# Patient Record
Sex: FEMALE | Race: WHITE
[De-identification: names, ages, dates, MRNs, and addresses within clinical notes are randomized per-mention and may not be internally consistent; named-entity substitution may affect disease eponyms.]

---

## 2019-07-11 ENCOUNTER — HOSPITAL ENCOUNTER (EMERGENCY)
Dept: HOSPITAL 91 - FTE | Age: 29
Discharge: HOME | End: 2019-07-11
Payer: MEDICAID

## 2019-07-11 ENCOUNTER — HOSPITAL ENCOUNTER (EMERGENCY)
Dept: HOSPITAL 10 - FTE | Age: 29
Discharge: HOME | End: 2019-07-11
Payer: MEDICAID

## 2019-07-11 VITALS
BODY MASS INDEX: 26.56 KG/M2 | BODY MASS INDEX: 26.56 KG/M2 | HEIGHT: 63 IN | WEIGHT: 149.91 LBS | HEIGHT: 63 IN | WEIGHT: 149.91 LBS

## 2019-07-11 VITALS — DIASTOLIC BLOOD PRESSURE: 78 MMHG | RESPIRATION RATE: 18 BRPM | SYSTOLIC BLOOD PRESSURE: 115 MMHG | HEART RATE: 104 BPM

## 2019-07-11 DIAGNOSIS — M25.571: Primary | ICD-10-CM

## 2019-07-11 PROCEDURE — 73630 X-RAY EXAM OF FOOT: CPT

## 2019-07-11 PROCEDURE — 73610 X-RAY EXAM OF ANKLE: CPT

## 2019-07-11 PROCEDURE — 99283 EMERGENCY DEPT VISIT LOW MDM: CPT

## 2019-07-11 PROCEDURE — 73590 X-RAY EXAM OF LOWER LEG: CPT

## 2019-07-11 RX ADMIN — HYDROCODONE BITARTRATE AND ACETAMINOPHEN 1 TAB: 5; 325 TABLET ORAL at 14:05

## 2019-07-11 RX ADMIN — ONDANSETRON 1 MG: 4 TABLET, ORALLY DISINTEGRATING ORAL at 14:05

## 2019-07-11 NOTE — EN
Date/Time of Note


Date/Time of Note


DATE: 7/11/19 


TIME: 13:55





ER Progress Note


29-year-old female presents for right ankle pain status post injury.





Medical screening exam initiated and lab/imaging tests ordered.  Patient will be


seen by another provider.











PATRICK WHITE DO                 Jul 11, 2019 13:55

## 2019-07-11 NOTE — ERD
ER Documentation


Chief Complaint


Chief Complaint





RIGHT ANKLE PAIN, SWELLING S/P FALL 10 MINUTES AGO, HIT ROCKS. CMS INTACT.





HPI


29-year-old female presenting with right ankle pain after she fell and hit a 


rock.  She states that she has pain with movement and there is pain to her heel.


 She has not taken any medications for her symptoms.  Denies any numbness or 


tingling.  Denies medical problems.  NKDA.  Surgical history denies.  Social 


history denies





ROS


All systems reviewed and are negative except as per history of present illness.





Medications


Home Meds


Active Scripts


Naproxen* (Naprosyn*) 500 Mg Tablet, 500 MG PO BID PRN for PAIN AND/OR 


INFLAMMATION, #30 TAB


   Prov:ANTONELLA DOMINGUEZ PA-C         19


Ondansetron (Ondansetron Odt) 4 Mg Tab.rapdis, 4 MG PO Q6H PRN for NAUSEA AND/OR


VOMITING, #10 TAB


   Prov:JEFF CASEY PA-C         10/12/16


Ibuprofen* (Motrin*) 400 Mg Tab, 400 MG PO Q6H PRN for PAIN AND OR ELEVATED 


TEMP, #30 TAB


   Prov:JEFF CASEY PA-C         10/12/16


Acetaminophen* (Tylenol*) 325 Mg Tablet, 2 TAB PO Q4 PRN for PAIN AND OR 


ELEVATED TEMP, #30 TAB


   Prov:JEFF CASEY PA-C         10/12/16


Amoxicillin* (Amoxicillin*) 500 Mg Cap, 500 MG PO BID for 10 Days, CAP


   Prov:JEFF CASEY PA-C         10/12/16


Reported Medications


Multivit/Min/Fol Ac/Iron/Pren* (Prenatal S*) 1 Tab Tab, 1 TAB PO DAILY, TAB


   4/20/15





Allergies


Allergies:  


Coded Allergies:  


     No Known Allergies (Verified  Allergy, Mild, 12)





PMhx/Soc


Medical and Surgical Hx:  pt denies Medical Hx, pt denies Surgical Hx


History of Surgery:  No


Anesthesia Reaction:  No


Hx Neurological Disorder:  Yes


Hx Respiratory Disorders:  No


Hx Cardiac Disorders:  No


Hx Psychiatric Problems:  No


Hx Miscellaneous Medical Probl:  No


Hx Alcohol Use:  No


Hx Substance Use:  No


Hx Tobacco Use:  No


Smoking Status:  Never smoker





FmHx


Family History:  No diabetes, No coronary disease, No other





Physical Exam


Vitals





Vital Signs


  Date      Temp  Pulse  Resp  B/P (MAP)   Pulse Ox  O2          O2 Flow    FiO2


Time                                                 Delivery    Rate


   19  99.4    104    18      115/78        97


     13:51                           (90)





Physical Exam


GENERAL: The patient is well-appearing, well-nourished, in no acute distress


CHEST: Clear to auscultation bilaterally.  There are no rales, wheezes or 


rhonchi.


HEART: Regular rate and rhythm.  No murmurs, clicks, rubs or gallops. 


EXTREMITIES: Equal pulses bilaterally.  There is no peripheral clubbing, 


cyanosis or edema.  No focal swelling or erythema.  Full range of motion.  


Grossly neurovascularly intact.


NEUROLOGIC: Alert and oriented.  Cranial nerves II through XII intact.  Motor 


strength in all 4 extremities with 5 out of 5 strength.  Sensation grossly 


intact.  Normal speech and gait.  


SKIN: Swelling noted to the right lateral ankle.  No pain with flexion and 


extension.  No tenderness to palpation of the proximal fibular head.  


Compartments soft.


Results 24 hrs





Current Medications


 Medications
   Dose
          Sig/Kirk
       Start Time
   Status  Last


 (Trade)       Ordered        Route
 PRN     Stop Time              Admin
Dose


                              Reason                                Admin


                1 tab          ONCE  ONCE
    19       DC           19


Acetaminophen                 PO
            14:30
                       14:05



/
                                           19 14:31


Hydrocodone


Bitart



(Norco


(5/325))


 Ondansetron    4 mg           ONCE  STAT
    19       DC           19


HCl
  (Zofran                 ODT
           14:01
                       14:05



Odt)                                         19 14:02








Procedures/MDM


                            DIAGNOSTIC IMAGING REPORT





Patient: VICTOR MANUEL GLOVER   : 1990   Age: 29  Sex: F                      


 


       MR #:    [id number]   Acct #:   [id number]    DOS: 19 1354


Ordering MD: PATRICK WHITE DO   Location:  [facility]   Room/Bed:                       


                    


                                        


PROCEDURE:   XR Ankle. 


 


CLINICAL INDICATION:   Pain 


 


TECHNIQUE:   AP, oblique and lateral views of the right ankle were performed. 


 


COMPARISON:   None. 


 


FINDINGS:


There is normal mineralization and alignment. 


No fracture or osseous lesion is identified. 


The joints are normal. 


There is soft tissue swelling lateral to lateral malleolus.


 


RPTAT: AA


 


IMPRESSION:


Soft tissue swelling lateral to the lateral malleolus with no acute fracture.





                            DIAGNOSTIC IMAGING REPORT





Patient: VICTOR MANUEL GLOVER   : 1990   Age: 29  Sex: F                      


 


       MR #:    [id number]   Acct #:   [id number]    DOS: 19 1354


Ordering MD: PATRICK WHITE DO   Location:  [facility]   Room/Bed:                       


                    


                                        


PROCEDURE:   XR Foot. 


 


CLINICAL INDICATION:   pain 


 


TECHNIQUE:   AP, lateral and oblique views of the right foot was obtained.  The 


images were reviewed on a PACS workstation. 


 


COMPARISON:   None. 


 


FINDINGS:


The bones of the foot appear intact, with no evidence of acute fracture, 


dislocation, or subluxation. 


The joint spaces are preserved. 


Bone mineralization is normal. 


No significant soft tissue swelling is seen.


 


RPTAT: AA 


 


IMPRESSION:


Unremarkable right foot radiographs.





                            DIAGNOSTIC IMAGING REPORT





Patient: VICTOR MANUEL GLOVER   : 1990   Age: 29  Sex: F                      


 


       MR #:    [id number]   Acct #:   [id number]    DOS: 19 1354


Ordering MD: PATRICK WHITE DO   Location:  [facility]   Room/Bed:                       


                    


                                        


PROCEDURE:   Right tibia and fibula x-ray 


 


CLINICAL INDICATION:   pain 


 


TECHNIQUE:   AP, lateral views of the tibia and fibula were obtained. 


 


COMPARISON:   None 


 


FINDINGS:


There is normal mineralization.  


No acute fracture or dislocation is seen.  


There are no significant degenerative changes.  


There is soft tissue swelling in the lateral right ankle.


 


RPTAT: AA


 


IMPRESSION:


Soft tissue swelling with no acute fracture.





MDM: 29-year-old female presenting with right ankle pain.  I have low suspicion 


for acute fracture dislocation.  I have low suspicion for tendon or ligament 


rupture.  Patient likely has bone contusion.  Patient is discharged with strict 


ER precautions and told to follow-up with primary care within 1 to 2 days for 


close evaluation.  Patient is told if symptoms change or worsen to return 


immediately to the ER.  All questions answered at discharge





Departure


Diagnosis:  


   Primary Impression:  


   Ankle pain


Condition:  Stable


Patient Instructions:  Sprain, Ankle, With X-Ray


Referrals:  


[facility]


YOU HAVE RECEIVED A MEDICAL SCREENING EXAM AND THE RESULTS INDICATE THAT YOU DO 


NOT HAVE A CONDITION THAT REQUIRES URGENT TREATMENT IN THE EMERGENCY DEPARTMENT.





FURTHER EVALUATION AND TREATMENT OF YOUR CONDITION CAN WAIT UNTIL YOU ARE SEEN 


IN YOUR DOCTORS OFFICE WITHIN THE NEXT 1-2 DAYS. IT IS YOUR RESPONSIBILITY TO 


MAKE AN APPOINTMENT FOR FOLOW-UP CARE.





IF YOU HAVE A PRIMARY DOCTOR


--you should call your primary doctor and schedule an appointment





IF YOU DO NOT HAVE A PRIMARY DOCTOR YOU CAN CALL OUR PHYSICIAN REFERRAL HOTLINE 


AT


 [phone] 





IF YOU CAN NOT AFFORD TO SEE A PHYSICIAN YOU CAN CHOSE FROM THE FOLLOWING 


[facility]





[facility] [phone] [address] [facility] [phone] [address] [facility]. [facility] [phone] [address] [facility] [phone] [address] [facility] [phone] [address] [facility]. [facility]. [phone] [address]





Additional Instructions:  


FOLLOW UP WITH YOUR PRIMARY CARE PHYSICIAN TOMORROW.Return to this facility if 


you are not improving as expected.











ANTONELLA DOMINGUEZ PA-C       2019 15:28